# Patient Record
Sex: FEMALE | Race: WHITE | NOT HISPANIC OR LATINO | URBAN - METROPOLITAN AREA
[De-identification: names, ages, dates, MRNs, and addresses within clinical notes are randomized per-mention and may not be internally consistent; named-entity substitution may affect disease eponyms.]

---

## 2023-05-11 ENCOUNTER — OFFICE VISIT (OUTPATIENT)
Dept: PRIMARY CARE | Facility: CLINIC | Age: 58
End: 2023-05-11
Payer: COMMERCIAL

## 2023-05-11 ENCOUNTER — LAB (OUTPATIENT)
Dept: LAB | Facility: LAB | Age: 58
End: 2023-05-11
Payer: COMMERCIAL

## 2023-05-11 VITALS
BODY MASS INDEX: 34.69 KG/M2 | OXYGEN SATURATION: 97 % | DIASTOLIC BLOOD PRESSURE: 128 MMHG | WEIGHT: 221 LBS | TEMPERATURE: 98.6 F | HEIGHT: 67 IN | HEART RATE: 102 BPM | SYSTOLIC BLOOD PRESSURE: 172 MMHG

## 2023-05-11 DIAGNOSIS — I15.9 SECONDARY HYPERTENSION: ICD-10-CM

## 2023-05-11 DIAGNOSIS — R19.7 DIARRHEA, UNSPECIFIED TYPE: ICD-10-CM

## 2023-05-11 DIAGNOSIS — Z00.00 ROUTINE PHYSICAL EXAMINATION: Primary | ICD-10-CM

## 2023-05-11 DIAGNOSIS — Z00.00 ROUTINE PHYSICAL EXAMINATION: ICD-10-CM

## 2023-05-11 DIAGNOSIS — R11.2 NAUSEA AND VOMITING, UNSPECIFIED VOMITING TYPE: ICD-10-CM

## 2023-05-11 LAB
ALANINE AMINOTRANSFERASE (SGPT) (U/L) IN SER/PLAS: 17 U/L (ref 7–45)
ALBUMIN (G/DL) IN SER/PLAS: 4.3 G/DL (ref 3.4–5)
ALKALINE PHOSPHATASE (U/L) IN SER/PLAS: 86 U/L (ref 33–110)
ANION GAP IN SER/PLAS: 13 MMOL/L (ref 10–20)
APPEARANCE, URINE: ABNORMAL
ASPARTATE AMINOTRANSFERASE (SGOT) (U/L) IN SER/PLAS: 15 U/L (ref 9–39)
BASOPHILS (10*3/UL) IN BLOOD BY AUTOMATED COUNT: 0.07 X10E9/L (ref 0–0.1)
BASOPHILS/100 LEUKOCYTES IN BLOOD BY AUTOMATED COUNT: 0.7 % (ref 0–2)
BILIRUBIN TOTAL (MG/DL) IN SER/PLAS: 0.6 MG/DL (ref 0–1.2)
BILIRUBIN, URINE: NEGATIVE
BLOOD, URINE: ABNORMAL
CALCIUM (MG/DL) IN SER/PLAS: 10.3 MG/DL (ref 8.6–10.6)
CALCIUM OXALATE CRYSTALS, URINE: ABNORMAL /HPF
CARBON DIOXIDE, TOTAL (MMOL/L) IN SER/PLAS: 28 MMOL/L (ref 21–32)
CHLORIDE (MMOL/L) IN SER/PLAS: 103 MMOL/L (ref 98–107)
CHOLESTEROL (MG/DL) IN SER/PLAS: 220 MG/DL (ref 0–199)
CHOLESTEROL IN HDL (MG/DL) IN SER/PLAS: 81.1 MG/DL
CHOLESTEROL/HDL RATIO: 2.7
COLOR, URINE: YELLOW
CREATININE (MG/DL) IN SER/PLAS: 0.86 MG/DL (ref 0.5–1.05)
EOSINOPHILS (10*3/UL) IN BLOOD BY AUTOMATED COUNT: 0.12 X10E9/L (ref 0–0.7)
EOSINOPHILS/100 LEUKOCYTES IN BLOOD BY AUTOMATED COUNT: 1.3 % (ref 0–6)
ERYTHROCYTE DISTRIBUTION WIDTH (RATIO) BY AUTOMATED COUNT: 14.1 % (ref 11.5–14.5)
ERYTHROCYTE MEAN CORPUSCULAR HEMOGLOBIN CONCENTRATION (G/DL) BY AUTOMATED: 31.1 G/DL (ref 32–36)
ERYTHROCYTE MEAN CORPUSCULAR VOLUME (FL) BY AUTOMATED COUNT: 91 FL (ref 80–100)
ERYTHROCYTES (10*6/UL) IN BLOOD BY AUTOMATED COUNT: 5.48 X10E12/L (ref 4–5.2)
GFR FEMALE: 79 ML/MIN/1.73M2
GLUCOSE (MG/DL) IN SER/PLAS: 88 MG/DL (ref 74–99)
GLUCOSE, URINE: NEGATIVE MG/DL
HEMATOCRIT (%) IN BLOOD BY AUTOMATED COUNT: 50.1 % (ref 36–46)
HEMOGLOBIN (G/DL) IN BLOOD: 15.6 G/DL (ref 12–16)
IMMATURE GRANULOCYTES/100 LEUKOCYTES IN BLOOD BY AUTOMATED COUNT: 0.3 % (ref 0–0.9)
KETONES, URINE: NEGATIVE MG/DL
LDL: 122 MG/DL (ref 0–99)
LEUKOCYTE ESTERASE, URINE: ABNORMAL
LEUKOCYTES (10*3/UL) IN BLOOD BY AUTOMATED COUNT: 9.4 X10E9/L (ref 4.4–11.3)
LYMPHOCYTES (10*3/UL) IN BLOOD BY AUTOMATED COUNT: 2.82 X10E9/L (ref 1.2–4.8)
LYMPHOCYTES/100 LEUKOCYTES IN BLOOD BY AUTOMATED COUNT: 29.9 % (ref 13–44)
MONOCYTES (10*3/UL) IN BLOOD BY AUTOMATED COUNT: 0.54 X10E9/L (ref 0.1–1)
MONOCYTES/100 LEUKOCYTES IN BLOOD BY AUTOMATED COUNT: 5.7 % (ref 2–10)
MUCUS, URINE: ABNORMAL /LPF
NEUTROPHILS (10*3/UL) IN BLOOD BY AUTOMATED COUNT: 5.86 X10E9/L (ref 1.2–7.7)
NEUTROPHILS/100 LEUKOCYTES IN BLOOD BY AUTOMATED COUNT: 62.1 % (ref 40–80)
NITRITE, URINE: NEGATIVE
NRBC (PER 100 WBCS) BY AUTOMATED COUNT: 0 /100 WBC (ref 0–0)
PH, URINE: 5 (ref 5–8)
PLATELETS (10*3/UL) IN BLOOD AUTOMATED COUNT: 346 X10E9/L (ref 150–450)
POTASSIUM (MMOL/L) IN SER/PLAS: 4.8 MMOL/L (ref 3.5–5.3)
PROTEIN TOTAL: 7.7 G/DL (ref 6.4–8.2)
PROTEIN, URINE: NEGATIVE MG/DL
RBC, URINE: 11 /HPF (ref 0–5)
SODIUM (MMOL/L) IN SER/PLAS: 139 MMOL/L (ref 136–145)
SPECIFIC GRAVITY, URINE: 1.02 (ref 1–1.03)
TRIGLYCERIDE (MG/DL) IN SER/PLAS: 85 MG/DL (ref 0–149)
UREA NITROGEN (MG/DL) IN SER/PLAS: 15 MG/DL (ref 6–23)
UROBILINOGEN, URINE: <2 MG/DL (ref 0–1.9)
VLDL: 17 MG/DL (ref 0–40)
WBC, URINE: ABNORMAL /HPF (ref 0–5)

## 2023-05-11 PROCEDURE — 85025 COMPLETE CBC W/AUTO DIFF WBC: CPT

## 2023-05-11 PROCEDURE — 36415 COLL VENOUS BLD VENIPUNCTURE: CPT

## 2023-05-11 PROCEDURE — 80053 COMPREHEN METABOLIC PANEL: CPT

## 2023-05-11 PROCEDURE — 3077F SYST BP >= 140 MM HG: CPT | Performed by: INTERNAL MEDICINE

## 2023-05-11 PROCEDURE — 93000 ELECTROCARDIOGRAM COMPLETE: CPT | Performed by: INTERNAL MEDICINE

## 2023-05-11 PROCEDURE — 3080F DIAST BP >= 90 MM HG: CPT | Performed by: INTERNAL MEDICINE

## 2023-05-11 PROCEDURE — 99203 OFFICE O/P NEW LOW 30 MIN: CPT | Performed by: INTERNAL MEDICINE

## 2023-05-11 PROCEDURE — 81001 URINALYSIS AUTO W/SCOPE: CPT

## 2023-05-11 PROCEDURE — 80061 LIPID PANEL: CPT

## 2023-05-11 RX ORDER — FOLIC ACID 1 MG/1
TABLET ORAL DAILY
COMMUNITY

## 2023-05-11 RX ORDER — IBUPROFEN 600 MG/1
TABLET ORAL
COMMUNITY
Start: 2023-02-24 | End: 2023-05-11 | Stop reason: WASHOUT

## 2023-05-11 RX ORDER — CHOLESTYRAMINE 4 G/9G
1 POWDER, FOR SUSPENSION ORAL 2 TIMES DAILY
Qty: 14 PACKET | Refills: 0 | Status: SHIPPED | OUTPATIENT
Start: 2023-05-11 | End: 2023-05-18

## 2023-05-11 RX ORDER — DICLOFENAC SODIUM 10 MG/G
GEL TOPICAL
COMMUNITY
Start: 2023-02-24

## 2023-05-11 RX ORDER — FAMOTIDINE 20 MG/1
20 TABLET, FILM COATED ORAL 2 TIMES DAILY
Qty: 10 TABLET | Refills: 1 | Status: SHIPPED | OUTPATIENT
Start: 2023-05-11 | End: 2023-05-16

## 2023-05-11 RX ORDER — ASPIRIN 81 MG/1
81 TABLET ORAL DAILY
COMMUNITY

## 2023-05-11 RX ORDER — METOPROLOL SUCCINATE 25 MG/1
25 TABLET, EXTENDED RELEASE ORAL DAILY
Qty: 10 TABLET | Refills: 0 | Status: SHIPPED | OUTPATIENT
Start: 2023-05-11 | End: 2023-05-21

## 2023-05-11 RX ORDER — ONDANSETRON 4 MG/1
4 TABLET, FILM COATED ORAL EVERY 8 HOURS PRN
Qty: 12 TABLET | Refills: 1 | Status: SHIPPED | OUTPATIENT
Start: 2023-05-11 | End: 2023-05-15

## 2023-05-11 ASSESSMENT — LIFESTYLE VARIABLES
HOW MANY STANDARD DRINKS CONTAINING ALCOHOL DO YOU HAVE ON A TYPICAL DAY: 1 OR 2
HOW OFTEN DO YOU HAVE SIX OR MORE DRINKS ON ONE OCCASION: LESS THAN MONTHLY
AUDIT-C TOTAL SCORE: 2
HOW OFTEN DO YOU HAVE A DRINK CONTAINING ALCOHOL: MONTHLY OR LESS
SKIP TO QUESTIONS 9-10: 0

## 2023-05-11 ASSESSMENT — PATIENT HEALTH QUESTIONNAIRE - PHQ9
1. LITTLE INTEREST OR PLEASURE IN DOING THINGS: NOT AT ALL
2. FEELING DOWN, DEPRESSED OR HOPELESS: NOT AT ALL
SUM OF ALL RESPONSES TO PHQ9 QUESTIONS 1 AND 2: 0

## 2023-05-11 ASSESSMENT — COLUMBIA-SUICIDE SEVERITY RATING SCALE - C-SSRS: 1. IN THE PAST MONTH, HAVE YOU WISHED YOU WERE DEAD OR WISHED YOU COULD GO TO SLEEP AND NOT WAKE UP?: NO

## 2023-05-11 NOTE — PROGRESS NOTES
"Subjective   Patient ID: Jessica Cantor is a 58 y.o. female who presents for New Patient Visit, Diarrhea, and Vomiting.    HPI patient presents to clinic in order to get established with the office.  She has been complaining of intermittent nausea vomiting and diarrhea going on for the past 4 days.  She denies any fever, chills, GI bleeding, abdominal pain, jaundice, swelling of legs and abdominal distention.  She is also requesting a routine physical examination.  She is noticed to have elevated blood pressure of 172/128 and after 5 minutes of rest it came down to 150/100.  EKG done in the office shows sinus rhythm at 78 bpm with left anterior fascicular block, normal interval without any ischemic changes, no prior EKG for comparison.    Past medical history significant for hypertension and Crohn's disease.      Review of Systems   Constitutional: Negative.    HENT: Negative.     Eyes: Negative.    Respiratory: Negative.     Cardiovascular: Negative.    Gastrointestinal:  Positive for diarrhea, nausea and vomiting.   Endocrine: Negative.    Genitourinary: Negative.    Musculoskeletal: Negative.    Skin: Negative.    Allergic/Immunologic: Negative.    Neurological: Negative.    Hematological: Negative.    Psychiatric/Behavioral: Negative.         Objective   BP (!) 172/128   Pulse 102   Temp 37 °C (98.6 °F)   Ht 1.702 m (5' 7\")   Wt 100 kg (221 lb)   SpO2 97%   BMI 34.61 kg/m²     Physical Exam  Constitutional:       Appearance: Normal appearance. She is normal weight.   HENT:      Right Ear: Tympanic membrane normal.      Left Ear: Tympanic membrane and ear canal normal.      Nose: Nose normal.   Neck:      Vascular: No carotid bruit.   Cardiovascular:      Rate and Rhythm: Normal rate.   Pulmonary:      Effort: No respiratory distress.      Breath sounds: No stridor. No wheezing.   Abdominal:      Palpations: Abdomen is soft.      Tenderness: There is no abdominal tenderness. There is no guarding or rebound. "   Skin:     Coloration: Skin is not jaundiced.      Findings: No bruising.   Neurological:      General: No focal deficit present.      Mental Status: She is alert and oriented to person, place, and time.   Psychiatric:         Mood and Affect: Mood normal.         Assessment/Plan    patient will be started on metoprolol 25 mg daily for hypertension.  She is encouraged drink fluids and is given trial with Questran, Pepcid and Zofran regarding her symptoms of gastroenteritis.  She is advised to stay on clear liquid diet for 24 hours and then gradually advance her diet.  She will be scheduled for routine blood work.  She will return to clinic in 1 week and we will make further recommendations.

## 2023-05-11 NOTE — LETTER
May 11, 2023     Patient: Jessica Cantor   YOB: 1965   Date of Visit: 5/11/2023       To Whom It May Concern:    Jessica Cantor was seen in my clinic on 5/11/2023 at 9:40 am. Please excuse Jessica from work starting 5/8/23 till 5/12/23 due to for her illness.  If you have any questions or concerns, please don't hesitate to call.         Sincerely,         Maikel Perdomo MD        CC: No Recipients

## 2023-05-11 NOTE — PROGRESS NOTES
"Subjective   Patient ID: Jessica Cantor is a 58 y.o. female who presents for New Patient Visit, Diarrhea, and Vomiting.    HPI     Review of Systems    Objective   Ht 1.702 m (5' 7\")   Wt 100 kg (221 lb)   BMI 34.61 kg/m²     Physical Exam    Assessment/Plan          "

## 2023-05-13 ASSESSMENT — ENCOUNTER SYMPTOMS
ENDOCRINE NEGATIVE: 1
PSYCHIATRIC NEGATIVE: 1
CONSTITUTIONAL NEGATIVE: 1
RESPIRATORY NEGATIVE: 1
ALLERGIC/IMMUNOLOGIC NEGATIVE: 1
EYES NEGATIVE: 1
VOMITING: 1
NAUSEA: 1
CARDIOVASCULAR NEGATIVE: 1
HEMATOLOGIC/LYMPHATIC NEGATIVE: 1
DIARRHEA: 1
NEUROLOGICAL NEGATIVE: 1
MUSCULOSKELETAL NEGATIVE: 1

## 2023-05-22 ENCOUNTER — TELEMEDICINE (OUTPATIENT)
Dept: PRIMARY CARE | Facility: CLINIC | Age: 58
End: 2023-05-22
Payer: COMMERCIAL

## 2023-05-22 DIAGNOSIS — I15.9 SECONDARY HYPERTENSION: Primary | ICD-10-CM

## 2023-05-22 DIAGNOSIS — R31.21 ASYMPTOMATIC MICROSCOPIC HEMATURIA: ICD-10-CM

## 2023-05-22 PROCEDURE — 99442 PR PHYS/QHP TELEPHONE EVALUATION 11-20 MIN: CPT | Performed by: INTERNAL MEDICINE

## 2023-05-22 RX ORDER — METOPROLOL SUCCINATE 50 MG/1
50 TABLET, EXTENDED RELEASE ORAL DAILY
Qty: 30 TABLET | Refills: 1 | Status: SHIPPED | OUTPATIENT
Start: 2023-05-22 | End: 2023-06-21

## 2023-05-22 NOTE — PROGRESS NOTES
Subjective   Patient ID: Jessica Cantor is a 58 y.o. female who presents for No chief complaint on file..    HPI patient is attended by phone visit to review her recent laboratory studies.  She continues to have elevated blood pressure in the range of 150/96.  She denies any headache, chest pain, fever chills shortness of breath and swelling of legs.  Laboratory studies done shows serum cholesterol of 220, LDL of 122, urinalysis revealed small amount of blood moderate amount of leukocyte esterase, 11 RBCs and other studies are unremarkable.    Review of Systems   Constitutional: Negative.    HENT: Negative.     Eyes: Negative.    Respiratory: Negative.     Cardiovascular: Negative.    Gastrointestinal: Negative.    Endocrine: Negative.    Genitourinary: Negative.    Musculoskeletal: Negative.    Skin: Negative.    Allergic/Immunologic: Negative.    Neurological: Negative.    Hematological: Negative.    Psychiatric/Behavioral: Negative.         Objective   There were no vitals taken for this visit.    Physical Exam not done    Assessment/Plan    patient advised to increase metoprolol to 50 mg daily in view of elevated blood pressure.  She will be scheduled for CT abdomen pelvis regarding hematuria.  She is advised to follow low-cholesterol diet.  She will return to clinic in 1 week for follow-up visit.

## 2023-05-27 ASSESSMENT — ENCOUNTER SYMPTOMS
PSYCHIATRIC NEGATIVE: 1
NEUROLOGICAL NEGATIVE: 1
HEMATOLOGIC/LYMPHATIC NEGATIVE: 1
CARDIOVASCULAR NEGATIVE: 1
ALLERGIC/IMMUNOLOGIC NEGATIVE: 1
RESPIRATORY NEGATIVE: 1
GASTROINTESTINAL NEGATIVE: 1
CONSTITUTIONAL NEGATIVE: 1
ENDOCRINE NEGATIVE: 1
EYES NEGATIVE: 1
MUSCULOSKELETAL NEGATIVE: 1

## 2024-02-18 NOTE — PROGRESS NOTES
"Subjective   Jessica Cantor is a 58 y.o. female who presents as a NPV TO ESTABLISH PCP CARE and CARE GAP REVIEW WITH COMPLAINTS OF LEFT LEG PAIN.    HPI:      57 yo female (smoking status) presenting as a NPV TO ESTABLISH PCP CARE and CARE GAP REVIEW WITH COMPLAINTS OF LEFT LEG PAIN.     [facility] records reviewed.    Last saw prior PCP Dr. Maikel Perdomo MD  as a virtual visit. Per provider note in 5/22/23, at visit:  \"HPI patient is attended by phone visit to review her recent laboratory studies.  She continues to have elevated blood pressure in the range of 150/96.  She denies any headache, chest pain, fever chills shortness of breath and swelling of legs.  Laboratory studies done shows serum cholesterol of 220, LDL of 122, urinalysis revealed small amount of blood moderate amount of leukocyte esterase, 11 RBCs and other studies are unremarkable.     patient advised to increase metoprolol to 50 mg daily in view of elevated blood pressure. She will be scheduled for CT abdomen pelvis regarding hematuria. She is advised to follow low-cholesterol diet. She will return to clinic in 1 week for follow-up visit. \"      PMHx:  -HTN  -Crohn's Disease  -chronic nausea and vomiting  -microscopic hematuria; 11 RBC on UA 5/11/23==> ORDERED CT Urogram 2/20/24 and  REFERRAL TO UROLOGY TODAY 2/20/24 for hematuria work up to rule out  neoplasm and nephrolithiasis  -RIGHT 6 cm renal cyst (abdominal US 7/30/29)      Healthcare Providers:  GI:  Prior PCP: Maikel Perdomo MD ; last seen as a virtual visit 5/22/23    Preventive Health Services:  -Last physical: ?  -last pap: 10/5/2020 Negative for intraepithelial lesion or malignancy.   -last mammogram: NOW DUE  -last colonoscopy: ? NOW DUE  -last STI screening: ?  -Hep C screening: ?    Immunizations:   -Childhood vaccines: completed per patient    -COVID vaccine and booster:  -updated COVID spike vaccine: NOW DUE  -Flu vaccine: NOW DUE  -shingles vaccine: NOW DUE    Immunization " History   Administered Date(s) Administered    Flu vaccine (IIV4), preservative free *Check age/dose* 11/22/2019, 10/12/2021    Influenza, injectable, quadrivalent 10/01/2016, 10/20/2016, 01/04/2017, 10/17/2018, 10/05/2020    Pfizer COVID-19 vaccine, bivalent, age 12 years and older (30 mcg/0.3 mL) 10/06/2022    Pfizer Purple Cap SARS-CoV-2 03/24/2021, 04/23/2021, 11/24/2021, 10/06/2022    Tdap vaccine, age 7 year and older (BOOSTRIX, ADACEL) 10/12/2021         Today Jessica  reports:    - left leg pain x days, characterized as   , rated in severity as /10, not worsening over time. She presley any known leg trauma. She denies fevers/chills,, back pain, saddle anesthesia, bladder or bowel inconyience or reentione, leg swelling, difficulty walking, leg weakness, or any known leg or back trauma.    -ongoing nausea    -last colonoscopy with GI ??    -otherwise doing and feeling well.     -taking all medications as prescribed with no reported adverse medication side effects    Today she has no other reported complaints, issues, or problems.    ROS is NEG for HEADACHE, NAUSEA, VOMITING, DIARRHEA, CHEST PAIN, SOB, and BLEEDING.  Review of systems (10+) is negative for all systems except for any identified issues in HPI above.      Sexually active with  Denies history STIs      SHx:  -lives with:  -employment:  -tobacco use:  -alcohol use:  -illicits:      FHx:  Cancer:  HTN:  DM:  Heart Disease:  Stroke:  Thyroid Disease:      Objective     There were no vitals taken for this visit.     Physical Examination:       GENERAL           General Appearance: well-appearing, well-developed, well-hydrated, well-nourished, no acute distress.        HEENT           NECK supple, no masses or thyromegaly, no carotid bruit.        EYES           Extraocular Movements: normal, bilateral eyes DONATO, no conjunctival injection.        HEART           Rate and Rhythm regular rate and rhythm. Heart sounds: normal S1S2, no S3 or S4. Murmurs:  none.        CHEST           Breath sounds: Clear to IPPA, RR<16 no use of accessory muscles.        ABDOMEN           General: Neg for LKKS or masses, no scleral icterus or jaundice.        MUSCULOSKELETAL           Joints Demonstration: Neg for erythema, swelling or joint deformities. gross abnormalities no gross abnormalities. LEFT LEG:        EXTREMITIES           Lower Extremities: Neg for cyanosis, clubbing or edema.       Assessment/Plan   Problem List Items Addressed This Visit    None    Establish PCP care  -labs ordered (see A/P above for details)    HTN:  -CMP, UA ordered  -cont BP medications  -low salt diet, regularly exercise, and limit alcohol intake      Left Leg pain: no red flag sxs. No known trauma      Crohn's Disease: last colonoscopy ?  -cont management per GI  -Emergency Dept precautions discussed and reviewed with patient    Asymptomatic microscopic hematuria: r/o  neoplasm  -UA ordered  -referral to urology ordered to for evaluation and work up  -CT Urogram ordered      Right renal 6 cm cyst (abdomen US 2019): r/o renal mass, given asymptomatic microscopic hematuria  -UA, CMP ordered  -CT Urogram ordered  -referral to urology ordered    History chronic nausea and vomiting  -CBC, CMP, amylase, lipase ordered  -GI referral ordered    Lipid Disorder screening  -lipid panel ordered    Diabetes Screening  -HgBA1c ordered    Vitamin D deficiency  -Vit D levels ordered     Hep C screening  -Hep C antibody ordered     STI Screening:  -HIV, syphilis, GC/CT, trich ordered    Breast Cancer Screening: MAMMOGRAM NOW DUE   -mammogram ordered     Cervical Cancer Screening; last pap smear ?  -GYN referral ordered to establish well woman care and for pap smear    Colon Cancer Screening: last colonoscopy ?   -colonoscopy ordered      Counseling:       Medication education:         Education:  The patient is counseled regarding potential side-effects of all new medications        Understanding:  Patient  expressed understanding        Adherence:  No barriers to adherence identified        Immunizations Counseling  -flu vaccine and shingles now due==>   -recommend updated COVID spike vaccine that can be obtained at local pharmacy     FOLLOW-UP: 4 weeks to discuss and review test results and 8 weeks for PHYSICAL     Discussed recommended plan of care with patient. Patient expressed understanding and agreement with plan of care. All of patient's questions were answered at the time. Patient had no additional questions at the time.         Brook Breen MD, PhD

## 2024-02-20 ENCOUNTER — APPOINTMENT (OUTPATIENT)
Dept: PRIMARY CARE | Facility: CLINIC | Age: 59
End: 2024-02-20
Payer: COMMERCIAL

## 2024-02-26 NOTE — PROGRESS NOTES
"Subjective   Jessica Cantor is a 58 y.o. female who presents as a NPV TO ESTABLISH PCP CARE and CARE GAP REVIEW WITH COMPLAINTS OF LEFT LEG PAIN.    HPI:      59 yo female (smoking status) presenting as a NPV TO ESTABLISH PCP CARE and CARE GAP REVIEW WITH COMPLAINTS OF LEFT LEG PAIN.      [facility] records reviewed.     Last saw prior PCP Dr. Maikel Perdomo MD  as a virtual visit. Per provider note in 5/22/23, at visit:  \"HPI patient is attended by phone visit to review her recent laboratory studies.  She continues to have elevated blood pressure in the range of 150/96.  She denies any headache, chest pain, fever chills shortness of breath and swelling of legs.  Laboratory studies done shows serum cholesterol of 220, LDL of 122, urinalysis revealed small amount of blood moderate amount of leukocyte esterase, 11 RBCs and other studies are unremarkable.      patient advised to increase metoprolol to 50 mg daily in view of elevated blood pressure. She will be scheduled for CT abdomen pelvis regarding hematuria. She is advised to follow low-cholesterol diet. She will return to clinic in 1 week for follow-up visit. \"        PMHx:  -HTN  -Crohn's Disease  -chronic nausea and vomiting  -microscopic hematuria; 11 RBC on UA 5/11/23==> ORDERED CT Urogram 2/27/24 and  REFERRAL TO UROLOGY TODAY 2/27/24 for hematuria work up to rule out  neoplasm and nephrolithiasis  -RIGHT 6 cm renal cyst (abdominal US 7/30/29)        Healthcare Providers:  GI:  Prior PCP: Maikel Perdomo MD ; last seen as a virtual visit 5/22/23     Preventive Health Services:  -Last physical: ?  -last pap: 10/5/2020 Negative for intraepithelial lesion or malignancy.   -last mammogram: NOW DUE  -last colonoscopy: ? NOW DUE  -last STI screening: ?  -Hep C screening: ?     Immunizations:   -Childhood vaccines: completed per patient    -COVID vaccine and booster: see below  -updated COVID spike vaccine: NOW DUE  -Flu vaccine: NOW DUE  -shingles vaccine: NOW DUE   "   Immunization History   Administered Date(s) Administered    Flu vaccine (IIV4), preservative free *Check age/dose* 11/22/2019, 10/12/2021    Influenza, injectable, quadrivalent 10/01/2016, 10/20/2016, 01/04/2017, 10/17/2018, 10/05/2020    Pfizer COVID-19 vaccine, bivalent, age 12 years and older (30 mcg/0.3 mL) 10/06/2022    Pfizer Purple Cap SARS-CoV-2 03/24/2021, 04/23/2021, 11/24/2021, 10/06/2022    Tdap vaccine, age 7 year and older (BOOSTRIX, ADACEL) 10/12/2021                Today Jessica  reports:     - left leg pain x days, characterized as   , rated in severity as /10, not worsening over time. She presley any known leg trauma. She denies fevers/chills,, back pain, saddle anesthesia, bladder or bowel inconyience or reentione, leg swelling, difficulty walking, leg weakness, or any known leg or back trauma.     -ongoing nausea     -last colonoscopy with GI ??     -otherwise doing and feeling well.      -taking all medications as prescribed with no reported adverse medication side effects     Today she has no other reported complaints, issues, or problems.     ROS is NEG for HEADACHE, NAUSEA, VOMITING, DIARRHEA, CHEST PAIN, SOB, and BLEEDING.  Review of systems (10+) is negative for all systems except for any identified issues in HPI above.        Sexually active with  Denies history STIs        SHx:  -lives with:  -employment:  -tobacco use:  -alcohol use:  -illicits:        FHx:  Cancer:  HTN:  DM:  Heart Disease:  Stroke:  Thyroid Disease:    Review of systems is essentially negative for all systems except for any identified issues in HPI above.    Objective     There were no vitals taken for this visit.     Physical Examination:       GENERAL           General Appearance: well-appearing, well-developed, well-hydrated, well-nourished, no acute distress.        HEENT           NECK supple, no masses or thyromegaly, no carotid bruit.        EYES           Extraocular Movements: normal, bilateral eyes DONATO, no  conjunctival injection.        HEART           Rate and Rhythm regular rate and rhythm. Heart sounds: normal S1S2, no S3 or S4. Murmurs: none.        CHEST           Breath sounds: Clear to IPPA, RR<16 no use of accessory muscles.        ABDOMEN           General: Neg for LKKS or masses, no scleral icterus or jaundice.        MUSCULOSKELETAL           Joints Demonstration: Neg for erythema, swelling or joint deformities. gross abnormalities no gross abnormalities.        EXTREMITIES           Lower Extremities: Neg for cyanosis, clubbing or edema.       Assessment/Plan   Problem List Items Addressed This Visit    None    Establish PCP care  -labs ordered (see A/P above for details)     HTN:  -CMP, UA ordered  -cont BP medications  -low salt diet, regularly exercise, and limit alcohol intake        Left Leg pain: no red flag sxs. No known trauma  -     Crohn's Disease: last colonoscopy ?  -cont management per GI  -Emergency Dept precautions discussed and reviewed with patient     Asymptomatic microscopic hematuria: r/o  neoplasm  -UA ordered  -referral to urology ordered to for evaluation and work up  -CT Urogram ordered        Right renal 6 cm cyst (abdomen US 2019): r/o renal mass, given asymptomatic microscopic hematuria  -UA, CMP ordered  -CT Urogram ordered  -referral to urology ordered     History chronic nausea and vomiting  -CBC, CMP, amylase, lipase ordered  -GI referral ordered     Lipid Disorder screening  -lipid panel ordered     Diabetes Screening  -HgBA1c ordered     Vitamin D deficiency  -Vit D levels ordered     Hep C screening  -Hep C antibody ordered     STI Screening:  -HIV, syphilis, GC/CT, trich ordered     Breast Cancer Screening: MAMMOGRAM NOW DUE   -mammogram ordered      Cervical Cancer Screening; last pap smear ?  -GYN referral ordered to establish well woman care and for pap smear     Colon Cancer Screening: last colonoscopy ?   -colonoscopy ordered        Counseling:       Medication  education:         Education:  The patient is counseled regarding potential side-effects of all new medications        Understanding:  Patient expressed understanding        Adherence:  No barriers to adherence identified        Immunizations Counseling  -flu vaccine and shingles now due==>   -recommend updated COVID spike vaccine that can be obtained at local pharmacy     FOLLOW-UP: 4 weeks to discuss and review test results and 8 weeks for PHYSICAL     Discussed recommended plan of care with patient. Patient expressed understanding and agreement with plan of care. All of patient's questions were answered at the time. Patient had no additional questions at the time.       Brook Breen MD, PhD

## 2024-02-27 ENCOUNTER — APPOINTMENT (OUTPATIENT)
Dept: PRIMARY CARE | Facility: CLINIC | Age: 59
End: 2024-02-27
Payer: COMMERCIAL

## 2024-03-07 NOTE — PROGRESS NOTES
"Subjective   Jessica Cantor is a 58 y.o. female who presents for No chief complaint on file..    HPI:      59 yo female (smoking status) presenting as a NPV TO ESTABLISH PCP CARE and CARE GAP REVIEW WITH COMPLAINTS OF LEFT LEG PAIN.      [facility] records reviewed.     Last saw prior PCP Dr. Maikel Perdomo MD  as a virtual visit. Per provider note in 5/22/23, at visit:  \"HPI patient is attended by phone visit to review her recent laboratory studies.  She continues to have elevated blood pressure in the range of 150/96.  She denies any headache, chest pain, fever chills shortness of breath and swelling of legs.  Laboratory studies done shows serum cholesterol of 220, LDL of 122, urinalysis revealed small amount of blood moderate amount of leukocyte esterase, 11 RBCs and other studies are unremarkable.      patient advised to increase metoprolol to 50 mg daily in view of elevated blood pressure. She will be scheduled for CT abdomen pelvis regarding hematuria. She is advised to follow low-cholesterol diet. She will return to clinic in 1 week for follow-up visit. \"        PMHx:  -HTN  -Crohn's Disease  -chronic nausea and vomiting  -microscopic hematuria; 11 RBC on UA 5/11/23==> ORDERED CT Urogram 3/8/24 and  REFERRAL TO UROLOGY TODAY 3/8/24 for hematuria work up to rule out  neoplasm and nephrolithiasis  -RIGHT 6 cm renal cyst (abdominal US 7/30/29)==> REFERRAL TO UROLOGY ORDERED TODAY 3/8/24        Healthcare Providers:  GI:  Prior PCP: Maikel Perdomo MD ; last seen as a virtual visit 5/22/23     Preventive Health Services:  -Last physical: ?  -last pap: 10/5/2020 Negative for intraepithelial lesion or malignancy.   -last mammogram: NOW DUE  -last colonoscopy: ? NOW DUE  -last STI screening: ?  -Hep C screening: ?     Immunizations:   -Childhood vaccines: completed per patient    -COVID vaccine and booster: see below  -updated COVID spike vaccine: NOW DUE  -Flu vaccine: NOW DUE  -shingles vaccine: NOW DUE   "     Immunization History   Administered Date(s) Administered    Flu vaccine (IIV4), preservative free *Check age/dose* 11/22/2019, 10/12/2021    Influenza, injectable, quadrivalent 10/01/2016, 10/20/2016, 01/04/2017, 10/17/2018, 10/05/2020    Pfizer COVID-19 vaccine, bivalent, age 12 years and older (30 mcg/0.3 mL) 10/06/2022    Pfizer Purple Cap SARS-CoV-2 03/24/2021, 04/23/2021, 11/24/2021, 10/06/2022    Tdap vaccine, age 7 year and older (BOOSTRIX, ADACEL) 10/12/2021               Today Jessica  reports:     - left leg pain x days, characterized as   , rated in severity as /10, not worsening over time. She presley any known leg trauma. She denies fevers/chills,, back pain, saddle anesthesia, bladder or bowel inconyience or reentione, leg swelling, difficulty walking, leg weakness, or any known leg or back trauma.     -ongoing nausea     -last colonoscopy with GI ??     -otherwise doing and feeling well.      -taking all medications as prescribed with no reported adverse medication side effects     Today she has no other reported complaints, issues, or problems.     ROS is NEG for HEADACHE, NAUSEA, VOMITING, DIARRHEA, CHEST PAIN, SOB, and BLEEDING.  Review of systems (10+) is negative for all systems except for any identified issues in HPI above.        Sexually active with  Denies history STIs        SHx:  -lives with:  -employment:  -tobacco use:  -alcohol use:  -illicits:        FHx:  Cancer:  HTN:  DM:  Heart Disease:  Stroke:  Thyroid Disease:       Objective     There were no vitals taken for this visit.     Physical Examination:       GENERAL           General Appearance: well-appearing, well-developed, well-hydrated, well-nourished, no acute distress.        HEENT           NECK supple, no masses or thyromegaly, no carotid bruit.        EYES           Extraocular Movements: normal, bilateral eyes DONATO, no conjunctival injection.        HEART           Rate and Rhythm regular rate and rhythm. Heart sounds:  normal S1S2, no S3 or S4. Murmurs: none.        CHEST           Breath sounds: Clear to IPPA, RR<16 no use of accessory muscles.        ABDOMEN           General: Neg for LKKS or masses, no scleral icterus or jaundice.        MUSCULOSKELETAL           Joints Demonstration: Neg for erythema, swelling or joint deformities. gross abnormalities no gross abnormalities.        EXTREMITIES           Lower Extremities: Neg for cyanosis, clubbing or edema.       Assessment/Plan   Problem List Items Addressed This Visit    None      Establish PCP care  -labs ordered (see A/P above for details)     HTN:  -CMP, UA ordered  -cont BP medications  -low salt diet, regularly exercise, and limit alcohol intake        Left Leg pain: no red flag sxs. No known trauma  -     Crohn's Disease: last colonoscopy ?  -cont management per GI==> ORDERED REFERRAL TO GI  -colonoscopy ordered  -Emergency Dept precautions discussed and reviewed with patient     Asymptomatic microscopic hematuria: r/o  neoplasm  -UA ordered  -referral to urology ordered to for evaluation and work up including cystoscopy  -CT Urogram ordered  -discussed with patient the importance of completing work up with urology to rule out a /urologic tumor        Right renal 6 cm cyst (abdomen US 2019): r/o renal mass, given asymptomatic microscopic hematuria  -UA, CMP ordered  -CT Urogram ordered  -referral to urology ordered for evaluation and management     History chronic nausea and vomiting  -CBC, CMP, amylase, lipase ordered  -GI referral ordered  -emergency Dept precautions discussed and reviewed with patient     Lipid Disorder screening  -lipid panel ordered     Diabetes Screening  -HgBA1c ordered     Vitamin D deficiency  -Vit D levels ordered     Hep C screening  -Hep C antibody ordered     STI Screening:  -HIV, syphilis, GC/CT, trich ordered     Breast Cancer Screening: MAMMOGRAM NOW DUE   -mammogram ordered      Cervical Cancer Screening; last pap smear ?  -GYN  referral ordered to establish well woman care and for pap smear     Colon Cancer Screening: last colonoscopy ?   -colonoscopy ordered        Counseling:       Medication education:         Education:  The patient is counseled regarding potential side-effects of all new medications        Understanding:  Patient expressed understanding        Adherence:  No barriers to adherence identified        Immunizations Counseling  -flu vaccine and shingles now due==>   -recommend updated COVID spike vaccine that can be obtained at local pharmacy     FOLLOW-UP: 4 weeks to discuss and review test results and 8 weeks for PHYSICAL     Discussed recommended plan of care with patient. Patient expressed understanding and agreement with plan of care. All of patient's questions were answered at the time. Patient had no additional questions at the time.         Brook Breen MD, PhD

## 2024-03-08 ENCOUNTER — APPOINTMENT (OUTPATIENT)
Dept: PRIMARY CARE | Facility: CLINIC | Age: 59
End: 2024-03-08
Payer: COMMERCIAL

## 2024-03-15 ENCOUNTER — APPOINTMENT (OUTPATIENT)
Dept: PRIMARY CARE | Facility: CLINIC | Age: 59
End: 2024-03-15
Payer: COMMERCIAL

## 2024-03-17 NOTE — PROGRESS NOTES
"Subjective   Jessica Cantor is a 58 y.o. female who presents for NPV TO ESTABLISH PCP CARE and CARE GAP REVIEW WITH COMPLAINTS OF LEFT LEG PAIN.    HPI:        57 yo female (smoking status) presenting as a NPV TO ESTABLISH PCP CARE and CARE GAP REVIEW WITH COMPLAINTS OF LEFT LEG PAIN.      [facility] records reviewed.     Last saw prior PCP Dr. Maikel Perdomo MD  as a virtual visit. Per provider note in 5/22/23, at visit:  \"HPI patient is attended by phone visit to review her recent laboratory studies.  She continues to have elevated blood pressure in the range of 150/96.  She denies any headache, chest pain, fever chills shortness of breath and swelling of legs.  Laboratory studies done shows serum cholesterol of 220, LDL of 122, urinalysis revealed small amount of blood moderate amount of leukocyte esterase, 11 RBCs and other studies are unremarkable.      patient advised to increase metoprolol to 50 mg daily in view of elevated blood pressure. She will be scheduled for CT abdomen pelvis regarding hematuria. She is advised to follow low-cholesterol diet. She will return to clinic in 1 week for follow-up visit. \"        PMHx:  -HTN  -Crohn's Disease  -chronic nausea and vomiting  -microscopic hematuria; 11 RBC on UA 5/11/23==> ORDERED CT Urogram 3/8/24 and  REFERRAL TO UROLOGY TODAY 3/8/24 for hematuria work up to rule out  neoplasm and nephrolithiasis  -RIGHT 6 cm renal cyst (abdominal US 7/30/29)==> REFERRAL TO UROLOGY ORDERED TODAY 3/8/24        Healthcare Providers:  GI:  Prior PCP: Maikel Perdomo MD ; last seen as a virtual visit 5/22/23     Preventive Health Services:  -Last physical: ?  -last pap: 10/5/2020 Negative for intraepithelial lesion or malignancy.   -last mammogram: NOW DUE  -last colonoscopy: ? NOW DUE  -last STI screening: ?  -Hep C screening: ?     Immunizations:   -Childhood vaccines: completed per patient    -COVID vaccine and booster: see below  -updated COVID spike vaccine: NOW DUE  -Flu " vaccine: NOW DUE  -shingles vaccine: NOW DUE     Immunization History   Administered Date(s) Administered    Flu vaccine (IIV4), preservative free *Check age/dose* 11/22/2019, 10/12/2021    Influenza, injectable, quadrivalent 10/01/2016, 10/20/2016, 01/04/2017, 10/17/2018, 10/05/2020    Pfizer COVID-19 vaccine, bivalent, age 12 years and older (30 mcg/0.3 mL) 10/06/2022    Pfizer Purple Cap SARS-CoV-2 03/24/2021, 04/23/2021, 11/24/2021, 10/06/2022    Tdap vaccine, age 7 year and older (BOOSTRIX, ADACEL) 10/12/2021            Today Jessica  reports:     - left leg pain x days, characterized as   , rated in severity as /10, not worsening over time. She presley any known leg trauma. She denies fevers/chills, back pain, saddle anesthesia, bladder or bowel incontience or retention, leg swelling, difficulty walking, leg weakness, or any known leg or back trauma.     -ongoing nausea     -last colonoscopy with GI ??     -otherwise doing and feeling well.      -taking all medications as prescribed with no reported adverse medication side effects     Today she has no other reported complaints, issues, or problems.     ROS is NEG for HEADACHE, NAUSEA, VOMITING, DIARRHEA, CHEST PAIN, SOB, and BLEEDING.  Review of systems (10+) is negative for all systems except for any identified issues in HPI above.        Sexually active with  Denies history STIs        SHx:  -lives with:  -employment:  -tobacco use:  -alcohol use:  -illicits:        FHx:  Cancer:  HTN:  DM:  Heart Disease:  Stroke:  Thyroid Disease:      Objective     There were no vitals taken for this visit.     Physical Examination:       GENERAL           General Appearance: well-appearing, well-developed, well-hydrated, well-nourished, no acute distress.        HEENT           NECK supple, no masses or thyromegaly, no carotid bruit.        EYES           Extraocular Movements: normal, bilateral eyes DONATO, no conjunctival injection.        HEART           Rate and Rhythm  regular rate and rhythm. Heart sounds: normal S1S2, no S3 or S4. Murmurs: none.        CHEST           Breath sounds: Clear to IPPA, RR<16 no use of accessory muscles.        ABDOMEN           General: Neg for LKKS or masses, no scleral icterus or jaundice.        MUSCULOSKELETAL           Joints Demonstration: Neg for erythema, swelling or joint deformities. gross abnormalities no gross abnormalities.        EXTREMITIES           Lower Extremities: Neg for cyanosis, clubbing or edema.       Assessment/Plan   Problem List Items Addressed This Visit    None    Establish PCP care  -labs ordered (see A/P above for details)     HTN:  -CMP, UA ordered  -cont BP medications  -low salt diet, regularly exercise, and limit alcohol intake        Left Leg pain: no red flag sxs. No known trauma  -     Crohn's Disease: last colonoscopy ?  -cont management per GI==> ORDERED REFERRAL TO GI  -colonoscopy ordered  -Emergency Dept precautions discussed and reviewed with patient     Asymptomatic microscopic hematuria: r/o  neoplasm  -UA ordered  -referral to urology ordered to for evaluation and work up including cystoscopy  -CT Urogram ordered  -discussed with patient the importance of completing work up with urology to rule out a /urologic tumor        Right renal 6 cm cyst (abdomen US 2019): r/o renal mass, given asymptomatic microscopic hematuria  -UA, CMP ordered  -CT Urogram ordered  -referral to urology ordered for evaluation and management     History chronic nausea and vomiting  -CBC, CMP, amylase, lipase ordered  -GI referral ordered  -emergency Dept precautions discussed and reviewed with patient     Lipid Disorder screening  -lipid panel ordered     Diabetes Screening  -HgBA1c ordered     Vitamin D deficiency  -Vit D levels ordered     Hep C screening  -Hep C antibody ordered     STI Screening:  -HIV, syphilis, GC/CT, trich ordered     Breast Cancer Screening: MAMMOGRAM NOW DUE   -mammogram ordered     Heart Disease  Screening:  -CT Heart Ca scoring ordered     Cervical Cancer Screening; last pap smear ?  -GYN referral ordered to establish well woman care and for pap smear     Colon Cancer Screening: last colonoscopy ?   -colonoscopy ordered        Counseling:       Medication education:         Education:  The patient is counseled regarding potential side-effects of all new medications        Understanding:  Patient expressed understanding        Adherence:  No barriers to adherence identified        Immunizations Counseling  -flu vaccine and shingles now due==>   -recommend updated COVID spike vaccine that can be obtained at local pharmacy     FOLLOW-UP: 4 weeks to discuss and review test results and 8 weeks for PHYSICAL     Discussed recommended plan of care with patient. Patient expressed understanding and agreement with plan of care. All of patient's questions were answered at the time. Patient had no additional questions at the time.          Brook Breen MD, PhD

## 2024-03-19 ENCOUNTER — APPOINTMENT (OUTPATIENT)
Dept: PRIMARY CARE | Facility: CLINIC | Age: 59
End: 2024-03-19
Payer: COMMERCIAL

## 2024-04-02 ENCOUNTER — APPOINTMENT (OUTPATIENT)
Dept: PRIMARY CARE | Facility: CLINIC | Age: 59
End: 2024-04-02
Payer: COMMERCIAL

## 2024-04-09 ENCOUNTER — APPOINTMENT (OUTPATIENT)
Dept: PRIMARY CARE | Facility: CLINIC | Age: 59
End: 2024-04-09
Payer: COMMERCIAL

## 2024-04-23 ENCOUNTER — APPOINTMENT (OUTPATIENT)
Dept: PRIMARY CARE | Facility: CLINIC | Age: 59
End: 2024-04-23
Payer: COMMERCIAL

## 2024-05-24 ENCOUNTER — APPOINTMENT (OUTPATIENT)
Dept: PRIMARY CARE | Facility: CLINIC | Age: 59
End: 2024-05-24
Payer: COMMERCIAL

## 2025-05-07 ENCOUNTER — HOSPITAL ENCOUNTER (OUTPATIENT)
Dept: RADIOLOGY | Facility: HOSPITAL | Age: 60
Discharge: HOME | End: 2025-05-07
Payer: COMMERCIAL

## 2025-05-07 VITALS — BODY MASS INDEX: 40.81 KG/M2 | HEIGHT: 67 IN | WEIGHT: 260 LBS

## 2025-05-07 DIAGNOSIS — Z78.0 ASYMPTOMATIC MENOPAUSAL STATE: ICD-10-CM

## 2025-05-07 DIAGNOSIS — Z12.31 ENCOUNTER FOR SCREENING MAMMOGRAM FOR MALIGNANT NEOPLASM OF BREAST: ICD-10-CM

## 2025-05-07 PROCEDURE — 77067 SCR MAMMO BI INCL CAD: CPT | Performed by: RADIOLOGY

## 2025-05-07 PROCEDURE — 77080 DXA BONE DENSITY AXIAL: CPT

## 2025-05-07 PROCEDURE — 77063 BREAST TOMOSYNTHESIS BI: CPT | Performed by: RADIOLOGY

## 2025-05-07 PROCEDURE — 77080 DXA BONE DENSITY AXIAL: CPT | Performed by: RADIOLOGY

## 2025-05-07 PROCEDURE — 77063 BREAST TOMOSYNTHESIS BI: CPT

## 2025-06-28 ENCOUNTER — HOSPITAL ENCOUNTER (OUTPATIENT)
Dept: RADIOLOGY | Facility: HOSPITAL | Age: 60
Discharge: HOME | End: 2025-06-28
Payer: COMMERCIAL

## 2025-06-28 DIAGNOSIS — E78.2 MIXED HYPERLIPIDEMIA: ICD-10-CM

## 2025-06-28 PROCEDURE — 75571 CT HRT W/O DYE W/CA TEST: CPT
